# Patient Record
Sex: FEMALE | Race: WHITE | NOT HISPANIC OR LATINO
[De-identification: names, ages, dates, MRNs, and addresses within clinical notes are randomized per-mention and may not be internally consistent; named-entity substitution may affect disease eponyms.]

---

## 2019-01-17 ENCOUNTER — TRANSCRIPTION ENCOUNTER (OUTPATIENT)
Age: 54
End: 2019-01-17

## 2019-03-25 ENCOUNTER — RESULT REVIEW (OUTPATIENT)
Age: 54
End: 2019-03-25

## 2020-01-21 ENCOUNTER — TRANSCRIPTION ENCOUNTER (OUTPATIENT)
Age: 55
End: 2020-01-21

## 2020-05-05 ENCOUNTER — TRANSCRIPTION ENCOUNTER (OUTPATIENT)
Age: 55
End: 2020-05-05

## 2020-07-30 ENCOUNTER — RESULT REVIEW (OUTPATIENT)
Age: 55
End: 2020-07-30

## 2020-10-02 ENCOUNTER — TRANSCRIPTION ENCOUNTER (OUTPATIENT)
Age: 55
End: 2020-10-02

## 2020-12-11 ENCOUNTER — TRANSCRIPTION ENCOUNTER (OUTPATIENT)
Age: 55
End: 2020-12-11

## 2021-01-16 ENCOUNTER — TRANSCRIPTION ENCOUNTER (OUTPATIENT)
Age: 56
End: 2021-01-16

## 2021-01-18 PROBLEM — Z00.00 ENCOUNTER FOR PREVENTIVE HEALTH EXAMINATION: Status: ACTIVE | Noted: 2021-01-18

## 2021-01-20 ENCOUNTER — APPOINTMENT (OUTPATIENT)
Dept: ORTHOPEDIC SURGERY | Facility: CLINIC | Age: 56
End: 2021-01-20
Payer: COMMERCIAL

## 2021-01-20 ENCOUNTER — OUTPATIENT (OUTPATIENT)
Dept: OUTPATIENT SERVICES | Facility: HOSPITAL | Age: 56
LOS: 1 days | End: 2021-01-20
Payer: COMMERCIAL

## 2021-01-20 ENCOUNTER — RESULT REVIEW (OUTPATIENT)
Age: 56
End: 2021-01-20

## 2021-01-20 DIAGNOSIS — Z80.3 FAMILY HISTORY OF MALIGNANT NEOPLASM OF BREAST: ICD-10-CM

## 2021-01-20 DIAGNOSIS — Z87.39 PERSONAL HISTORY OF OTHER DISEASES OF THE MUSCULOSKELETAL SYSTEM AND CONNECTIVE TISSUE: ICD-10-CM

## 2021-01-20 DIAGNOSIS — Z86.79 PERSONAL HISTORY OF OTHER DISEASES OF THE CIRCULATORY SYSTEM: ICD-10-CM

## 2021-01-20 DIAGNOSIS — Z85.820 PERSONAL HISTORY OF MALIGNANT MELANOMA OF SKIN: ICD-10-CM

## 2021-01-20 PROCEDURE — 73610 X-RAY EXAM OF ANKLE: CPT

## 2021-01-20 PROCEDURE — 99072 ADDL SUPL MATRL&STAF TM PHE: CPT

## 2021-01-20 PROCEDURE — 73630 X-RAY EXAM OF FOOT: CPT | Mod: 26,LT

## 2021-01-20 PROCEDURE — 73630 X-RAY EXAM OF FOOT: CPT

## 2021-01-20 PROCEDURE — 99204 OFFICE O/P NEW MOD 45 MIN: CPT

## 2021-01-20 PROCEDURE — 73610 X-RAY EXAM OF ANKLE: CPT | Mod: 26,LT

## 2021-01-20 RX ORDER — OLMESARTAN MEDOXOMIL 20 MG/1
20 TABLET, FILM COATED ORAL
Refills: 0 | Status: ACTIVE | COMMUNITY

## 2021-01-20 RX ORDER — VALACYCLOVIR 500 MG/1
500 TABLET, FILM COATED ORAL
Refills: 0 | Status: ACTIVE | COMMUNITY

## 2021-01-28 ENCOUNTER — APPOINTMENT (OUTPATIENT)
Dept: RHEUMATOLOGY | Facility: CLINIC | Age: 56
End: 2021-01-28
Payer: COMMERCIAL

## 2021-01-28 VITALS
HEIGHT: 66 IN | DIASTOLIC BLOOD PRESSURE: 88 MMHG | WEIGHT: 130 LBS | BODY MASS INDEX: 20.89 KG/M2 | TEMPERATURE: 97.6 F | SYSTOLIC BLOOD PRESSURE: 167 MMHG | OXYGEN SATURATION: 99 % | HEART RATE: 54 BPM

## 2021-01-28 DIAGNOSIS — M25.579 PAIN IN UNSPECIFIED ANKLE AND JOINTS OF UNSPECIFIED FOOT: ICD-10-CM

## 2021-01-28 PROCEDURE — 99072 ADDL SUPL MATRL&STAF TM PHE: CPT

## 2021-01-28 PROCEDURE — 36415 COLL VENOUS BLD VENIPUNCTURE: CPT

## 2021-01-28 PROCEDURE — 99243 OFF/OP CNSLTJ NEW/EST LOW 30: CPT | Mod: 25

## 2021-01-29 LAB
ALBUMIN SERPL ELPH-MCNC: 4.5 G/DL
ALP BLD-CCNC: 45 U/L
ALT SERPL-CCNC: 18 U/L
ANION GAP SERPL CALC-SCNC: 13 MMOL/L
AST SERPL-CCNC: 22 U/L
BASOPHILS # BLD AUTO: 0.02 K/UL
BASOPHILS NFR BLD AUTO: 0.5 %
BILIRUB SERPL-MCNC: 0.3 MG/DL
BUN SERPL-MCNC: 14 MG/DL
CALCIUM SERPL-MCNC: 9.7 MG/DL
CHLORIDE SERPL-SCNC: 105 MMOL/L
CO2 SERPL-SCNC: 23 MMOL/L
CREAT SERPL-MCNC: 0.69 MG/DL
CRP SERPL-MCNC: <0.1 MG/DL
EOSINOPHIL # BLD AUTO: 0.16 K/UL
EOSINOPHIL NFR BLD AUTO: 4.1 %
ERYTHROCYTE [SEDIMENTATION RATE] IN BLOOD BY WESTERGREN METHOD: 10 MM/HR
GLUCOSE SERPL-MCNC: 100 MG/DL
HCT VFR BLD CALC: 35.7 %
HGB BLD-MCNC: 11.1 G/DL
IMM GRANULOCYTES NFR BLD AUTO: 0 %
LYMPHOCYTES # BLD AUTO: 1.39 K/UL
LYMPHOCYTES NFR BLD AUTO: 35.6 %
MAN DIFF?: NORMAL
MCHC RBC-ENTMCNC: 31.1 GM/DL
MCHC RBC-ENTMCNC: 32.4 PG
MCV RBC AUTO: 104.1 FL
MONOCYTES # BLD AUTO: 0.51 K/UL
MONOCYTES NFR BLD AUTO: 13.1 %
NEUTROPHILS # BLD AUTO: 1.82 K/UL
NEUTROPHILS NFR BLD AUTO: 46.7 %
PLATELET # BLD AUTO: 281 K/UL
POTASSIUM SERPL-SCNC: 4.1 MMOL/L
PROT SERPL-MCNC: 6.7 G/DL
RBC # BLD: 3.43 M/UL
RBC # FLD: 12.5 %
SODIUM SERPL-SCNC: 142 MMOL/L
WBC # FLD AUTO: 3.9 K/UL

## 2021-01-30 LAB — RHEUMATOID FACT SER QL: <10 IU/ML

## 2021-01-31 LAB
CCP AB SER IA-ACNC: <8 UNITS
RF+CCP IGG SER-IMP: NEGATIVE

## 2021-01-31 NOTE — ASSESSMENT
[FreeTextEntry1] : 55 year old woman referred by orthopedist for rheumatologic evaluation.  Patient with left foot and ankle pain and swelling of the left achilles and surrounding lateral aspect of the ankle.  Patient reports some improvement in symptoms, although still with prominent swelling.  Given chronic nature of symptoms and severity of swelling,will evaluated for an underlying inflammatory arthropathy, will check cbc, cmp,esr, crp, RF, CCP, and Hla b27.  Patient reports bone density in 2019, she will forward results to office for review.  Depending on date of last bone density, may obtain additional studies as well.

## 2021-01-31 NOTE — PHYSICAL EXAM
[General Appearance - Alert] : alert [General Appearance - In No Acute Distress] : in no acute distress [General Appearance - Well-Appearing] : healthy appearing [Sclera] : the sclera and conjunctiva were normal [Respiration, Rhythm And Depth] : normal respiratory rhythm and effort [Exaggerated Use Of Accessory Muscles For Inspiration] : no accessory muscle use [No Spinal Tenderness] : no spinal tenderness [Nail Clubbing] : no clubbing  or cyanosis of the fingernails [Skin Color & Pigmentation] : normal skin color and pigmentation [] : no rash [Skin Lesions] : no skin lesions [Oriented To Time, Place, And Person] : oriented to person, place, and time [Impaired Insight] : insight and judgment were intact [Affect] : the affect was normal [Mood] : the mood was normal [FreeTextEntry1] : CAM boot on left lower extremity.  Edema of the achilles and lateral ankle with tenderness.  No erythema.  (Images reviewed on phone as well, previously more swollen).

## 2021-01-31 NOTE — HISTORY OF PRESENT ILLNESS
[FreeTextEntry1] : 55 year old women referred by orthopedist\par \par Patient with chronic left ankle and foot pain\par Better than previously, but may need surgery in the next month \par Chronic achilles pain and swelling with tears \par Bone spur as well\par \par Patient with "wear and tear," for about the past five years, worse over the last 6 months, no identifiable injury or trauma\par Currently In a CAM boot, on the left foot for the last two weeks\par Fractured fibula in May on the right, healing well \par \par Was exercising through the pain as well\par Exercises, works as , feels ok on the bike, continues upper body exercises \par \par HIstory of stress fractures in both feet through the years from running\par No recent menses, last 11/2014\par \par No history of hormone replacement \par DEXA 9/19\par Calcium supplements\par Does not eat dairy products, only milk with cereal\par \par No family history of osteoporosis\par No family history of psoriasis\par No family history of arthritis\par \par No joint pains of the upper extremities, no pain in the knees or hips\par History of  L4-L5 degeneration, sees orthopedist on the upper [place], had epidural shots about three months ago which helped with back pain\par Acupuncture in future\par Feels pain may be related to uneven gait due to the boot \par No history of trauma \par \par No history of UC or Crohns\par No history of psoriasis\par No known history of osteoporosis

## 2021-01-31 NOTE — CONSULT LETTER
[Dear  ___] : Dear  [unfilled], [Consult Letter:] : I had the pleasure of evaluating your patient, [unfilled]. [Please see my note below.] : Please see my note below. [Consult Closing:] : Thank you very much for allowing me to participate in the care of this patient.  If you have any questions, please do not hesitate to contact me. [Sincerely,] : Sincerely, [FreeTextEntry3] : Kailyn Noyola MD, MPH

## 2021-01-31 NOTE — DATA REVIEWED
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 3 views of the right ankle and 3 views of the right foot \par \par Clinical indications: Right leg pain. \par \par IMPRESSION: There is a nondisplaced transverse fracture of the distal fibula \par approximately 6.3 cm superior to the distal aspect of the fibula. The \par tibiotalar joint is intact. There is no fracture of the proximal fibula or \par of the tibia. There is minimal degenerative change of the knee. \par \par There is mild degenerative change at the first MTP joint. Moderate \par degenerative changes seen at the first TMT joint. \par

## 2021-02-03 ENCOUNTER — APPOINTMENT (OUTPATIENT)
Dept: ORTHOPEDIC SURGERY | Facility: CLINIC | Age: 56
End: 2021-02-03
Payer: COMMERCIAL

## 2021-02-03 VITALS — BODY MASS INDEX: 20.89 KG/M2 | WEIGHT: 130 LBS | HEIGHT: 66 IN | RESPIRATION RATE: 16 BRPM

## 2021-02-03 PROCEDURE — 99072 ADDL SUPL MATRL&STAF TM PHE: CPT

## 2021-02-03 PROCEDURE — 99214 OFFICE O/P EST MOD 30 MIN: CPT

## 2021-02-04 ENCOUNTER — TRANSCRIPTION ENCOUNTER (OUTPATIENT)
Age: 56
End: 2021-02-04

## 2021-02-05 LAB — HLA-B27 RELATED AG QL: NEGATIVE

## 2021-02-15 ENCOUNTER — TRANSCRIPTION ENCOUNTER (OUTPATIENT)
Age: 56
End: 2021-02-15

## 2021-02-17 ENCOUNTER — TRANSCRIPTION ENCOUNTER (OUTPATIENT)
Age: 56
End: 2021-02-17

## 2021-02-18 ENCOUNTER — APPOINTMENT (OUTPATIENT)
Dept: ORTHOPEDIC SURGERY | Facility: AMBULATORY SURGERY CENTER | Age: 56
End: 2021-02-18

## 2021-02-18 ENCOUNTER — RESULT REVIEW (OUTPATIENT)
Age: 56
End: 2021-02-18

## 2021-02-18 ENCOUNTER — OUTPATIENT (OUTPATIENT)
Dept: OUTPATIENT SERVICES | Facility: HOSPITAL | Age: 56
LOS: 1 days | Discharge: HOME | End: 2021-02-18
Payer: COMMERCIAL

## 2021-02-18 PROCEDURE — 88304 TISSUE EXAM BY PATHOLOGIST: CPT | Mod: 26

## 2021-02-18 PROCEDURE — 27654 REPAIR OF ACHILLES TENDON: CPT | Mod: LT

## 2021-02-18 PROCEDURE — 28118 REMOVAL OF HEEL BONE: CPT | Mod: LT

## 2021-02-18 PROCEDURE — 27691 REVISE LOWER LEG TENDON: CPT | Mod: LT

## 2021-02-19 ENCOUNTER — NON-APPOINTMENT (OUTPATIENT)
Age: 56
End: 2021-02-19

## 2021-02-22 ENCOUNTER — NON-APPOINTMENT (OUTPATIENT)
Age: 56
End: 2021-02-22

## 2021-02-23 LAB — SURGICAL PATHOLOGY STUDY: SIGNIFICANT CHANGE UP

## 2021-03-03 ENCOUNTER — APPOINTMENT (OUTPATIENT)
Dept: ORTHOPEDIC SURGERY | Facility: CLINIC | Age: 56
End: 2021-03-03
Payer: COMMERCIAL

## 2021-03-03 PROCEDURE — 99024 POSTOP FOLLOW-UP VISIT: CPT

## 2021-03-04 VITALS — BODY MASS INDEX: 20.89 KG/M2 | WEIGHT: 130 LBS | HEIGHT: 66 IN | RESPIRATION RATE: 16 BRPM

## 2021-04-02 ENCOUNTER — APPOINTMENT (OUTPATIENT)
Dept: ORTHOPEDIC SURGERY | Facility: CLINIC | Age: 56
End: 2021-04-02
Payer: COMMERCIAL

## 2021-04-02 VITALS — RESPIRATION RATE: 16 BRPM | BODY MASS INDEX: 20.89 KG/M2 | WEIGHT: 130 LBS | HEIGHT: 66 IN

## 2021-04-02 PROCEDURE — 99024 POSTOP FOLLOW-UP VISIT: CPT

## 2021-04-02 PROCEDURE — 73650 X-RAY EXAM OF HEEL: CPT | Mod: LT

## 2021-04-02 RX ORDER — ONDANSETRON 4 MG/1
4 TABLET ORAL EVERY 8 HOURS
Qty: 15 | Refills: 2 | Status: DISCONTINUED | COMMUNITY
Start: 2021-02-15 | End: 2021-04-02

## 2021-04-02 RX ORDER — OXYCODONE 5 MG/1
5 TABLET ORAL
Qty: 50 | Refills: 0 | Status: DISCONTINUED | COMMUNITY
Start: 2021-02-15 | End: 2021-04-02

## 2021-04-02 RX ORDER — DOCUSATE SODIUM 100 MG/1
100 CAPSULE ORAL TWICE DAILY
Qty: 28 | Refills: 3 | Status: DISCONTINUED | COMMUNITY
Start: 2021-02-15 | End: 2021-04-02

## 2021-04-18 ENCOUNTER — TRANSCRIPTION ENCOUNTER (OUTPATIENT)
Age: 56
End: 2021-04-18

## 2021-05-10 ENCOUNTER — APPOINTMENT (OUTPATIENT)
Dept: ORTHOPEDIC SURGERY | Facility: CLINIC | Age: 56
End: 2021-05-10
Payer: COMMERCIAL

## 2021-05-10 VITALS — WEIGHT: 130 LBS | RESPIRATION RATE: 16 BRPM | HEIGHT: 66 IN | BODY MASS INDEX: 20.89 KG/M2

## 2021-05-10 PROCEDURE — 99024 POSTOP FOLLOW-UP VISIT: CPT

## 2021-05-10 RX ORDER — OXYCODONE 5 MG/1
5 TABLET ORAL
Qty: 40 | Refills: 0 | Status: DISCONTINUED | COMMUNITY
Start: 2021-04-02 | End: 2021-05-10

## 2021-08-02 ENCOUNTER — APPOINTMENT (OUTPATIENT)
Dept: ORTHOPEDIC SURGERY | Facility: CLINIC | Age: 56
End: 2021-08-02
Payer: COMMERCIAL

## 2021-08-02 VITALS — RESPIRATION RATE: 16 BRPM | HEIGHT: 66 IN | WEIGHT: 130 LBS | BODY MASS INDEX: 20.89 KG/M2

## 2021-08-02 PROCEDURE — 99213 OFFICE O/P EST LOW 20 MIN: CPT

## 2021-08-02 PROCEDURE — 73650 X-RAY EXAM OF HEEL: CPT | Mod: LT

## 2021-08-02 RX ORDER — GABAPENTIN 300 MG/1
300 CAPSULE ORAL DAILY
Qty: 110 | Refills: 0 | Status: DISCONTINUED | COMMUNITY
Start: 2021-02-05 | End: 2021-08-02

## 2021-08-02 RX ORDER — MELOXICAM 15 MG/1
15 TABLET ORAL
Qty: 90 | Refills: 0 | Status: DISCONTINUED | COMMUNITY
Start: 2021-01-20 | End: 2021-08-02

## 2021-08-02 RX ORDER — ASPIRIN 325 MG/1
325 TABLET, FILM COATED ORAL
Qty: 60 | Refills: 2 | Status: DISCONTINUED | COMMUNITY
Start: 2021-02-15 | End: 2021-08-02

## 2021-08-05 ENCOUNTER — RESULT REVIEW (OUTPATIENT)
Age: 56
End: 2021-08-05

## 2021-08-10 ENCOUNTER — TRANSCRIPTION ENCOUNTER (OUTPATIENT)
Age: 56
End: 2021-08-10

## 2021-11-29 ENCOUNTER — APPOINTMENT (OUTPATIENT)
Dept: ORTHOPEDIC SURGERY | Facility: CLINIC | Age: 56
End: 2021-11-29
Payer: COMMERCIAL

## 2021-11-29 VITALS — RESPIRATION RATE: 16 BRPM | BODY MASS INDEX: 20.89 KG/M2 | HEIGHT: 66 IN | WEIGHT: 130 LBS

## 2021-11-29 DIAGNOSIS — G89.29 PAIN IN LEFT ANKLE AND JOINTS OF LEFT FOOT: ICD-10-CM

## 2021-11-29 DIAGNOSIS — M77.52 OTHER ENTHESOPATHY OF LT FOOT AND ANKLE: ICD-10-CM

## 2021-11-29 DIAGNOSIS — M25.572 PAIN IN LEFT ANKLE AND JOINTS OF LEFT FOOT: ICD-10-CM

## 2021-11-29 DIAGNOSIS — S86.012A STRAIN OF LEFT ACHILLES TENDON, INITIAL ENCOUNTER: ICD-10-CM

## 2021-11-29 DIAGNOSIS — M92.62 JUVENILE OSTEOCHONDROSIS OF TARSUS, LEFT ANKLE: ICD-10-CM

## 2021-11-29 DIAGNOSIS — M54.30 SCIATICA, UNSPECIFIED SIDE: ICD-10-CM

## 2021-11-29 PROCEDURE — 99213 OFFICE O/P EST LOW 20 MIN: CPT

## 2021-11-29 PROCEDURE — 73650 X-RAY EXAM OF HEEL: CPT | Mod: LT

## 2022-01-26 ENCOUNTER — NON-APPOINTMENT (OUTPATIENT)
Age: 57
End: 2022-01-26

## 2022-02-02 ENCOUNTER — APPOINTMENT (OUTPATIENT)
Dept: PHYSICAL MEDICINE AND REHAB | Facility: CLINIC | Age: 57
End: 2022-02-02
Payer: COMMERCIAL

## 2022-02-02 VITALS — HEIGHT: 66 IN | BODY MASS INDEX: 20.89 KG/M2 | WEIGHT: 130 LBS | RESPIRATION RATE: 16 BRPM

## 2022-02-02 PROCEDURE — 99204 OFFICE O/P NEW MOD 45 MIN: CPT

## 2022-02-02 NOTE — PHYSICAL EXAM
[FreeTextEntry1] : LUMBAR\par GEN: AAOx3. NAD.\par INSP: Spine alignment midline. No evidence of scoliosis.\par STANCE: Trendelenburg/SLS (-) R (-) L. \par GAIT: (-) Antalgic. Normal reciprocating heel to toe\par SENS: Grossly intact to LT BLE.\par REFLEXES: Symmetric patella, achilles. \par TONE: Normal. No clonus.\par SPECIAL: SLR/Slump (-) R (-) L.   Gaenslen (-) R (-) L.\par                 FADIR (-) R (-) L.          DAMARIS (-) R (-) L.  \par PALP: Midline/Spinous processes (-).   Paraspinals (-) R  (-) L.   \par            QL (-) R (-) L.      SIJ (-) R (-) L.            GTB (-) R (-) L.\par \par LSpine          ROM    Axial Sx    LE Sx \par Flex               Full       (-)           R (-) L (-).\par Ext                 Full       (-)           R (-) L (-).\par Lat Flex         Full       (-)           R (-) L (-).       \par Rotation         Full       (-)           R (-) L (-). \par Oblique Ext    Full       (-)           R (-) L (-).  \par \par HIP ROM:    RIGHT   Sx        LEFT   Sx\par Flex             Full       (-)         Full     (-)\par IR                 Full       (-)        Full     (-)\par ER               Full       (-)        Full     (-)\par \par STRENGTH:    RIGHT      LEFT\par Hip Flex            5/5 5/5\par Hip ABd            5/5 5/5\par Knee Ext           5/5 5/5\par Ankle DF           5/5 5/5\par Ankle PF           5/5 5/5\par EHL                   5/5 5/5\par EV                    5/5 5/5

## 2022-02-02 NOTE — HISTORY OF PRESENT ILLNESS
[FreeTextEntry1] : Referring Physician: Dr. Rajendra Aldridge MD\par \par Ms. ABDI PADILLA is a very pleasant 56 year female who comes in for evaluation of Lower back pain that has been ongoing for 2 years without any specific injury or inciting event. Patient has tried Gabapentin, Celebrex, PT all of which helped. She also had 2 Lumbar Epidural Injections with Dr. Sourav Xie from [facility], on 05/2021 which helped temporarily. The pain is located primarily on Lower back radiating to BILATERAL LEGS intermittent and described as achy and sharp. The pain is rated as 3/10 and ranges from 2-9/10. The patient is unsure which symptoms aggravate and alleviated by Medication and PT. The patient works as a  which consists of sitting or standing. The patient denies any night pain, numbness/tingling, weakness, or bowel/bladder dysfunction. The patient has no other complaints at this time.

## 2022-02-02 NOTE — DATA REVIEWED
[MRI] : MRI [FreeTextEntry1] : MRI LS 08/2021: L4/5 Anterolisthesis. Bilateral facet arthrosis. Central canal stenosis. Bilateral lateral recess/foraminal stenosis.

## 2022-02-02 NOTE — ASSESSMENT
[FreeTextEntry1] : Impression:\par 1. Lumbar Spinal Stenosis and Radiculopathy\par \par Plan: The history, physical examination, and imaging were reviewed. The imaging findings and diagnosis were discussed with the patient along with treatment options including physical therapy, oral medication, interventional spine procedures, and surgery; as well as alternative therapeutics such as acupuncture and massage. We also discussed the importance of activity modification, ergonomics, and posture as they pertain to the current condition as well as overall health and well being. At this time the patient is interested in interventional options for symptom reduction. We will plan for Bilateral L5 TFESI. We discussed all the risks, benefits, alternative treatments, and prognosis. The patient expressed understanding and would like to move forward. We will schedule for the injection as well as follow up post-procedure. I provided renewed Rx for her Gabapentin and Celebrex, and an updated referral for PT. The patient expressed verbal understanding and is in agreement with the plan of care. All of the patient's questions and concerns were addressed during today's visit.\par

## 2022-03-10 ENCOUNTER — APPOINTMENT (OUTPATIENT)
Dept: PHYSICAL MEDICINE AND REHAB | Facility: CLINIC | Age: 57
End: 2022-03-10
Payer: COMMERCIAL

## 2022-03-10 PROCEDURE — 64483 NJX AA&/STRD TFRM EPI L/S 1: CPT | Mod: 50

## 2022-03-23 ENCOUNTER — APPOINTMENT (OUTPATIENT)
Dept: PHYSICAL MEDICINE AND REHAB | Facility: CLINIC | Age: 57
End: 2022-03-23
Payer: COMMERCIAL

## 2022-03-23 VITALS — HEIGHT: 66 IN | BODY MASS INDEX: 20.89 KG/M2 | WEIGHT: 130 LBS | RESPIRATION RATE: 16 BRPM

## 2022-03-23 PROCEDURE — 99213 OFFICE O/P EST LOW 20 MIN: CPT

## 2022-04-14 NOTE — HISTORY OF PRESENT ILLNESS
[FreeTextEntry1] : Ms. ABDI PADILLA is a very pleasant 56 year female who comes in for follow up of Lower back pain after undergoing an TERRI on 03/10/22. The patient reports approximately 70% improvement following the procedure. There is still some residual discomfort, which remains Lower back no longer radiating to the legs, intermittent and described as dull/achy. The pain is rated as 1/10 and ranges from 0-3/10. The patient is unsure which symptoms aggravate and alleviated by Medication and PT. The patient works as a  which consists of sitting or standing. The patient denies any night pain, numbness/tingling, weakness, or bowel/bladder dysfunction. The patient has no other complaints at this time.

## 2022-04-14 NOTE — ASSESSMENT
[FreeTextEntry1] : Impression:\par 1. Lumbar Spinal Stenosis and Radiculopathy\par \par Plan: The history, physical examination, and imaging were reviewed. The patient responded well to TERRI with good relief. The imaging and diagnosis were reviewed with the patient. She would like to return to a course of PT, I provided her with a referral today. We emphasized the importance of the home exercise program. The patient will follow up in 3-4 months. The patient expressed verbal understanding and is in agreement with the plan of care. All of the patient's questions and concerns were addressed during today's visit.\par

## 2022-05-13 ENCOUNTER — APPOINTMENT (OUTPATIENT)
Dept: ORTHOPEDIC SURGERY | Facility: CLINIC | Age: 57
End: 2022-05-13

## 2022-05-13 VITALS — WEIGHT: 130 LBS | BODY MASS INDEX: 20.89 KG/M2 | HEIGHT: 66 IN

## 2022-05-13 DIAGNOSIS — M76.62 ACHILLES TENDINITIS, LEFT LEG: ICD-10-CM

## 2022-05-13 DIAGNOSIS — M25.571 PAIN IN RIGHT ANKLE AND JOINTS OF RIGHT FOOT: ICD-10-CM

## 2022-05-13 DIAGNOSIS — M25.572 PAIN IN LEFT ANKLE AND JOINTS OF LEFT FOOT: ICD-10-CM

## 2022-05-13 DIAGNOSIS — S93.492A SPRAIN OF OTHER LIGAMENT OF LEFT ANKLE, INITIAL ENCOUNTER: ICD-10-CM

## 2022-05-13 DIAGNOSIS — M79.672 PAIN IN LEFT FOOT: ICD-10-CM

## 2022-05-13 DIAGNOSIS — M67.88 OTHER SPECIFIED DISORDERS OF SYNOVIUM AND TENDON, OTHER SITE: ICD-10-CM

## 2022-05-13 PROCEDURE — 73610 X-RAY EXAM OF ANKLE: CPT | Mod: 26,LT

## 2022-05-13 PROCEDURE — 99213 OFFICE O/P EST LOW 20 MIN: CPT

## 2022-05-13 PROCEDURE — 73630 X-RAY EXAM OF FOOT: CPT | Mod: 26,LT

## 2022-05-13 RX ORDER — GABAPENTIN 300 MG/1
300 CAPSULE ORAL
Qty: 30 | Refills: 0 | Status: DISCONTINUED | COMMUNITY
Start: 2022-02-02 | End: 2022-05-13

## 2022-05-27 ENCOUNTER — TRANSCRIPTION ENCOUNTER (OUTPATIENT)
Age: 57
End: 2022-05-27

## 2022-06-14 PROBLEM — M76.62 ACHILLES TENDINITIS OF LEFT LOWER EXTREMITY: Status: ACTIVE | Noted: 2021-01-20

## 2022-06-14 PROBLEM — S93.492A SPRAIN OF ANTERIOR TALOFIBULAR LIGAMENT OF LEFT ANKLE, INITIAL ENCOUNTER: Status: ACTIVE | Noted: 2022-06-14

## 2022-06-14 PROBLEM — M67.88 ACHILLES TENDINOSIS OF LEFT LOWER EXTREMITY: Status: ACTIVE | Noted: 2021-01-20

## 2022-06-14 PROBLEM — M79.672 PAIN OF LEFT HEEL: Status: ACTIVE | Noted: 2022-06-14

## 2022-06-14 PROBLEM — M25.571 ACUTE RIGHT ANKLE PAIN: Status: ACTIVE | Noted: 2022-05-13

## 2022-06-16 ENCOUNTER — NON-APPOINTMENT (OUTPATIENT)
Age: 57
End: 2022-06-16

## 2022-06-22 ENCOUNTER — APPOINTMENT (OUTPATIENT)
Dept: PHYSICAL MEDICINE AND REHAB | Facility: CLINIC | Age: 57
End: 2022-06-22
Payer: COMMERCIAL

## 2022-06-22 VITALS — WEIGHT: 130 LBS | HEIGHT: 66 IN | BODY MASS INDEX: 20.89 KG/M2

## 2022-06-22 PROCEDURE — 99214 OFFICE O/P EST MOD 30 MIN: CPT

## 2022-06-22 NOTE — ASSESSMENT
[FreeTextEntry1] : Impression:\par 1. Lumbar Spinal Stenosis and Radiculopathy\par \par Plan: The history, physical examination, and imaging were reviewed. The patient responded well to TERRI with good relief, however symptoms have started to return. The imaging and diagnosis were reviewed with the patient along with treatment options. She would like to repeat TERRI based on previous results. We discussed all the risks, benefits, alternative treatments, and prognosis. The patient expressed understanding and would like to move forward. We will schedule for the injection as well as follow up post-procedure. She is also ready to consider surgical options as duration of relief from TERRI will not be a sustainable treatment moving forward. I provided a referral to see Dr. Camacho along with a referral for new MRI LSpine. The patient expressed verbal understanding and is in agreement with the plan of care. All of the patient's questions and concerns were addressed during today's visit.\par

## 2022-06-22 NOTE — HISTORY OF PRESENT ILLNESS
[FreeTextEntry1] : Ms. ABDI PADILLA is a very pleasant 56 year female who comes in for follow up of Lower back pain. She had excellent relief after TERRI on 03/10/22, and she has been doing PT/HEP since then as well, however symptoms have been gradually returning over the past few weeks. Symptoms remain Lower back radiating to bilateral legs, intermittent and described as shooting, achy. The pain is rated as 4/10 and ranges from 3-8/10. The patient is unsure which symptoms aggravate and alleviated by Medication and PT. The patient works as a . The patient denies any night pain, numbness/tingling, weakness, or bowel/bladder dysfunction. The patient has no other complaints at this time.

## 2022-07-14 ENCOUNTER — APPOINTMENT (OUTPATIENT)
Dept: PHYSICAL MEDICINE AND REHAB | Facility: CLINIC | Age: 57
End: 2022-07-14

## 2022-07-14 PROCEDURE — 64483 NJX AA&/STRD TFRM EPI L/S 1: CPT | Mod: 50

## 2022-07-20 ENCOUNTER — APPOINTMENT (OUTPATIENT)
Dept: ORTHOPEDIC SURGERY | Facility: CLINIC | Age: 57
End: 2022-07-20

## 2022-07-20 VITALS
HEIGHT: 66 IN | SYSTOLIC BLOOD PRESSURE: 126 MMHG | DIASTOLIC BLOOD PRESSURE: 78 MMHG | HEART RATE: 67 BPM | OXYGEN SATURATION: 99 % | BODY MASS INDEX: 20.89 KG/M2 | WEIGHT: 130 LBS

## 2022-07-20 DIAGNOSIS — M43.16 SPONDYLOLISTHESIS, LUMBAR REGION: ICD-10-CM

## 2022-07-20 PROCEDURE — 99214 OFFICE O/P EST MOD 30 MIN: CPT

## 2022-07-20 PROCEDURE — 72083 X-RAY EXAM ENTIRE SPI 4/5 VW: CPT

## 2022-08-12 PROBLEM — M43.16 SPONDYLOLISTHESIS AT L4-L5 LEVEL: Status: ACTIVE | Noted: 2022-08-12

## 2022-08-12 NOTE — HISTORY OF PRESENT ILLNESS
[de-identified] : Initial 7/20/22: Ms. Madera was referred to us by Dr. Cook. Patient reports approximately a year and a half of low back pain that radiates posterolaterally down her right greater than left lower extremities. Patient had taken gabapentin in the past and is now taking up to 6 Aleve a day. She has also had 2 epidural steroid injections bilateral L5 transforaminal TFESI with Dr. Cook. First injection was in March of 2022 and gave her 3 months of significant relief. Last injection was approximately a week ago with no significant relief to date. Patient works as a cycling and . Current symptoms significantly interfere with her ability to perform ADLs and perform the duties of her job. She has been unable to run over the past year and a half and do high impact workouts. However, still struggles with low impact workouts due to her symptoms.

## 2022-08-12 NOTE — ADDENDUM
[FreeTextEntry1] : I, Carie Elliott, assisted with documentation on 07/20/2022 acting as scribe for Dr. Rojelio Camacho.

## 2022-08-12 NOTE — PHYSICAL EXAM
[de-identified] : General: patient is well developed, well nourished, in no acute \par distress, alert and oriented x 3. \par \par Mood and affect: normal\par \par Respiratory: no respiratory distress noted\par \par Alignment:The spine is well compensated in the coronal and sagittal plane. \par \par Gait: The patient is able to toe walk and heel walk without difficulty. \par \par Palpation: no tenderness to palpation spine or paraspinal region\par \par Neurologic Exam:\par Motor: Manual Muscle testing in the lower extremities is 5 out of 5 in all muscle groups. There is no evidence of muscular atrophy in the lower extremities. Sensory: Sensation to light touch is grossly intact in the lower extremities\par \par Hip Exam: No pain with internal or external rotation of hips bilaterally\par \par Special tests: Straight leg raise test negative. Cross straight leg test negative.  [de-identified] : MRI lumbar 7/18/22 (my read): L4 bilateral pars defects, L4/L5 grade 1 anterolisthesis, disc bulge and facet arthropathy with severe central canal stenosis, mild/moderate bilateral foraminal stenosis; L3/L4 grade 1 anterolisthesis, moderate central canal and mild/moderate bilateral foraminal stenosis\par \par XR 7/20/22 (my read): multilevel disc degeneration, grade 1/2 anterolisthesis L4 on L5 without evidence of dynamic instability, mild thoracolumbar scoliosis, no fractures seen

## 2022-08-15 ENCOUNTER — APPOINTMENT (OUTPATIENT)
Dept: ORTHOPEDIC SURGERY | Facility: CLINIC | Age: 57
End: 2022-08-15

## 2022-08-24 ENCOUNTER — APPOINTMENT (OUTPATIENT)
Dept: PHYSICAL MEDICINE AND REHAB | Facility: CLINIC | Age: 57
End: 2022-08-24

## 2022-08-24 VITALS — BODY MASS INDEX: 20.09 KG/M2 | WEIGHT: 125 LBS | HEIGHT: 66 IN

## 2022-08-24 PROCEDURE — 99214 OFFICE O/P EST MOD 30 MIN: CPT

## 2022-08-24 NOTE — HISTORY OF PRESENT ILLNESS
[FreeTextEntry1] : 08/24/2022: Patient is here for follow up after undergoing an TERRI on 07/14/22. The patient reports approximately 50% improvement following the procedure. There is still some residual discomfort, which remains lower back radiating to bilateral legs. The patient is planning to proceed with surgery in the next few months, however would like to consider repeat injection in the interim. The patient has no new complaints. \par  \par \par 06/22/2022: Ms. ABDI PADILLA is a very pleasant 56 year female who comes in for follow up of Lower back pain. She had excellent relief after TERRI on 03/10/22, and she has been doing PT/HEP since then as well, however symptoms have been gradually returning over the past few weeks. Symptoms remain Lower back radiating to bilateral legs, intermittent and described as shooting, achy. The pain is rated as 4/10 and ranges from 3-8/10. The patient is unsure which symptoms aggravate and alleviated by Medication and PT. The patient works as a . The patient denies any night pain, numbness/tingling, weakness, or bowel/bladder dysfunction. The patient has no other complaints at this time.

## 2022-08-24 NOTE — ASSESSMENT
[FreeTextEntry1] : Impression:\par 1. Lumbar Spinal Stenosis and Radiculopathy\par \par Plan: The history, physical examination, and imaging were reviewed. The patient responded well to TERRI. The imaging and diagnosis were reviewed with the patient along with treatment options. She would like to repeat TERRI prior to undergoing surgery. We will plan for Bilateral L5 TFESI. The patient expressed understanding and would like to move forward. We will schedule for the injection as well as follow up post-procedure. The patient expressed verbal understanding and is in agreement with the plan of care. All of the patient's questions and concerns were addressed during today's visit.\par

## 2022-08-29 ENCOUNTER — APPOINTMENT (OUTPATIENT)
Dept: ORTHOPEDIC SURGERY | Facility: CLINIC | Age: 57
End: 2022-08-29

## 2022-09-15 ENCOUNTER — APPOINTMENT (OUTPATIENT)
Dept: PHYSICAL MEDICINE AND REHAB | Facility: CLINIC | Age: 57
End: 2022-09-15

## 2022-09-15 DIAGNOSIS — M54.16 RADICULOPATHY, LUMBAR REGION: ICD-10-CM

## 2022-09-15 DIAGNOSIS — M48.062 SPINAL STENOSIS, LUMBAR REGION WITH NEUROGENIC CLAUDICATION: ICD-10-CM

## 2022-09-15 PROCEDURE — 64483 NJX AA&/STRD TFRM EPI L/S 1: CPT | Mod: 50

## 2022-09-22 PROBLEM — M54.16 LUMBAR RADICULOPATHY: Status: ACTIVE | Noted: 2022-02-02

## 2022-09-22 PROBLEM — M48.062 SPINAL STENOSIS, LUMBAR REGION WITH NEUROGENIC CLAUDICATION: Status: ACTIVE | Noted: 2022-02-02

## 2023-02-08 ENCOUNTER — NON-APPOINTMENT (OUTPATIENT)
Age: 58
End: 2023-02-08

## 2024-06-13 ENCOUNTER — APPOINTMENT (OUTPATIENT)
Dept: ORTHOPEDIC SURGERY | Facility: CLINIC | Age: 59
End: 2024-06-13

## 2024-06-25 ENCOUNTER — APPOINTMENT (OUTPATIENT)
Dept: ORTHOPEDIC SURGERY | Facility: CLINIC | Age: 59
End: 2024-06-25
Payer: COMMERCIAL

## 2024-06-25 VITALS — HEIGHT: 66 IN | WEIGHT: 125 LBS | BODY MASS INDEX: 20.09 KG/M2

## 2024-06-25 DIAGNOSIS — M75.101 UNSPECIFIED ROTATOR CUFF TEAR OR RUPTURE OF RIGHT SHOULDER, NOT SPECIFIED AS TRAUMATIC: ICD-10-CM

## 2024-06-25 PROCEDURE — 99214 OFFICE O/P EST MOD 30 MIN: CPT

## 2024-06-25 RX ORDER — DIAZEPAM 5 MG/1
5 TABLET ORAL
Qty: 4 | Refills: 0 | Status: DISCONTINUED | COMMUNITY
Start: 2022-07-12 | End: 2024-06-25

## 2024-06-25 RX ORDER — CELECOXIB 100 MG/1
100 CAPSULE ORAL TWICE DAILY
Qty: 60 | Refills: 0 | Status: DISCONTINUED | COMMUNITY
Start: 2022-02-02 | End: 2024-06-25

## 2024-08-15 ENCOUNTER — APPOINTMENT (OUTPATIENT)
Dept: ORTHOPEDIC SURGERY | Facility: CLINIC | Age: 59
End: 2024-08-15
Payer: COMMERCIAL

## 2024-08-15 VITALS — HEIGHT: 66 IN | WEIGHT: 125 LBS | BODY MASS INDEX: 20.09 KG/M2

## 2024-08-15 DIAGNOSIS — M75.101 UNSPECIFIED ROTATOR CUFF TEAR OR RUPTURE OF RIGHT SHOULDER, NOT SPECIFIED AS TRAUMATIC: ICD-10-CM

## 2024-08-15 PROCEDURE — 99213 OFFICE O/P EST LOW 20 MIN: CPT

## 2024-10-23 ENCOUNTER — APPOINTMENT (OUTPATIENT)
Age: 59
End: 2024-10-23

## 2025-01-10 NOTE — ASU PATIENT PROFILE, ADULT - FALL HARM RISK - UNIVERSAL INTERVENTIONS
Bed in lowest position, wheels locked, appropriate side rails in place/Call bell, personal items and telephone in reach/Instruct patient to call for assistance before getting out of bed or chair/Non-slip footwear when patient is out of bed/[place] to call system/Physically safe environment - no spills, clutter or unnecessary equipment/Purposeful Proactive Rounding/Room/bathroom lighting operational, light cord in reach

## 2025-01-10 NOTE — ASU PATIENT PROFILE, ADULT - NSICDXPASTSURGICALHX_GEN_ALL_CORE_FT
PAST SURGICAL HISTORY:  H/O melanoma excision     History of augmentation of both breasts     History of tonsillectomy     Rupture of left Achilles tendon descending ileac vein    S/P insertion of iliac artery stent     S/P lumbar spinal fusion L4-L5

## 2025-01-13 ENCOUNTER — TRANSCRIPTION ENCOUNTER (OUTPATIENT)
Age: 60
End: 2025-01-13

## 2025-01-13 ENCOUNTER — OUTPATIENT (OUTPATIENT)
Dept: OUTPATIENT SERVICES | Facility: HOSPITAL | Age: 60
LOS: 1 days | Discharge: HOME | End: 2025-01-13
Payer: COMMERCIAL

## 2025-01-13 VITALS
TEMPERATURE: 98 F | DIASTOLIC BLOOD PRESSURE: 72 MMHG | HEIGHT: 66 IN | HEART RATE: 54 BPM | WEIGHT: 123.24 LBS | SYSTOLIC BLOOD PRESSURE: 126 MMHG | RESPIRATION RATE: 16 BRPM | OXYGEN SATURATION: 100 %

## 2025-01-13 VITALS
SYSTOLIC BLOOD PRESSURE: 124 MMHG | OXYGEN SATURATION: 96 % | DIASTOLIC BLOOD PRESSURE: 68 MMHG | HEART RATE: 59 BPM | RESPIRATION RATE: 16 BRPM | TEMPERATURE: 98 F

## 2025-01-13 DIAGNOSIS — Z98.890 OTHER SPECIFIED POSTPROCEDURAL STATES: Chronic | ICD-10-CM

## 2025-01-13 DIAGNOSIS — S86.012A STRAIN OF LEFT ACHILLES TENDON, INITIAL ENCOUNTER: Chronic | ICD-10-CM

## 2025-01-13 DIAGNOSIS — Z95.828 PRESENCE OF OTHER VASCULAR IMPLANTS AND GRAFTS: Chronic | ICD-10-CM

## 2025-01-13 DIAGNOSIS — Z98.1 ARTHRODESIS STATUS: Chronic | ICD-10-CM

## 2025-01-13 DIAGNOSIS — Z90.89 ACQUIRED ABSENCE OF OTHER ORGANS: Chronic | ICD-10-CM

## 2025-01-13 PROCEDURE — 49650 LAP ING HERNIA REPAIR INIT: CPT | Mod: AS,LT

## 2025-01-13 DEVICE — MESH HERNIA INGUINAL 3DMAX MEDIUM 3 X 5" LEFT: Type: IMPLANTABLE DEVICE | Site: LEFT | Status: FUNCTIONAL

## 2025-01-13 RX ORDER — OLMESARTAN MEDOXOMIL 40 MG/1
1 TABLET, FILM COATED ORAL
Refills: 0 | DISCHARGE

## 2025-01-13 RX ORDER — ONDANSETRON 4 MG/1
4 TABLET ORAL ONCE
Refills: 0 | Status: COMPLETED | OUTPATIENT
Start: 2025-01-13 | End: 2025-01-13

## 2025-01-13 RX ORDER — CHLORHEXIDINE GLUCONATE 1.2 MG/ML
1 RINSE ORAL ONCE
Refills: 0 | Status: COMPLETED | OUTPATIENT
Start: 2025-01-13 | End: 2025-01-13

## 2025-01-13 RX ORDER — ACETAMINOPHEN 80 MG/.8ML
1000 SOLUTION/ DROPS ORAL ONCE
Refills: 0 | Status: COMPLETED | OUTPATIENT
Start: 2025-01-13 | End: 2025-01-13

## 2025-01-13 RX ORDER — APREPITANT 40 MG/1
40 CAPSULE ORAL ONCE
Refills: 0 | Status: COMPLETED | OUTPATIENT
Start: 2025-01-13 | End: 2025-01-13

## 2025-01-13 RX ORDER — SODIUM CHLORIDE 9 MG/ML
500 INJECTION, SOLUTION INTRAVENOUS
Refills: 0 | Status: DISCONTINUED | OUTPATIENT
Start: 2025-01-13 | End: 2025-01-13

## 2025-01-13 RX ORDER — OXYCODONE AND ACETAMINOPHEN 5; 325 MG/1; MG/1
1 TABLET ORAL
Qty: 20 | Refills: 0
Start: 2025-01-13

## 2025-01-13 RX ORDER — VALACYCLOVIR HYDROCHLORIDE 1000 MG/1
1 TABLET, FILM COATED ORAL
Refills: 0 | DISCHARGE

## 2025-01-13 RX ADMIN — ONDANSETRON 4 MILLIGRAM(S): 4 TABLET ORAL at 10:55

## 2025-01-13 RX ADMIN — CHLORHEXIDINE GLUCONATE 1 APPLICATION(S): 1.2 RINSE ORAL at 05:55

## 2025-01-13 RX ADMIN — APREPITANT 40 MILLIGRAM(S): 40 CAPSULE ORAL at 06:15

## 2025-01-13 RX ADMIN — ACETAMINOPHEN 1000 MILLIGRAM(S): 80 SOLUTION/ DROPS ORAL at 06:15

## 2025-01-13 NOTE — ASU DISCHARGE PLAN (ADULT/PEDIATRIC) - NS MD DC FALL RISK RISK
For information on Fall & Injury Prevention, visit: [email or web address] OR  [email or web address] OR  https://www.cdc.gov/steadi/patient.html

## 2025-01-13 NOTE — ASU DISCHARGE PLAN (ADULT/PEDIATRIC) - FINANCIAL ASSISTANCE
[facility] provides services at a reduced cost to those who are determined to be eligible through [facility]’s financial assistance program. Information regarding [facility]’s financial assistance program can be found by going to [email or web address] or by calling [phone].

## 2025-01-13 NOTE — PRE-ANESTHESIA EVALUATION ADULT - NSANTHOSAYNRD_GEN_A_CORE
No. HARPREET screening performed.  STOP BANG Legend: 0-2 = LOW Risk; 3-4 = INTERMEDIATE Risk; 5-8 = HIGH Risk

## (undated) DEVICE — ELCTR GROUNDING PAD ADULT COVIDIEN

## (undated) DEVICE — WARMING BLANKET LOWER ADULT

## (undated) DEVICE — TROCAR COVIDIEN VERSAONE BLUNT TIP HASSAN 12MM

## (undated) DEVICE — SUSPENSORY WITH LEG STRAPS LARGE

## (undated) DEVICE — XI TIP COVER

## (undated) DEVICE — XI CANNULA SEAL 5MM - 8 MM

## (undated) DEVICE — GOWN ROYAL SILK XL

## (undated) DEVICE — D HELP - CLEARVIEW CLEARIFY SYSTEM

## (undated) DEVICE — XI DRAPE COLUMN

## (undated) DEVICE — XI ENDOWRIST 12 - 8 MM CANNULA REDUCER

## (undated) DEVICE — NDL LABORIE INJETAK ADJUSTABLE TIP 35CM

## (undated) DEVICE — SUT VICRYL 0 27" UR-6

## (undated) DEVICE — PACK GENERAL LAPAROSCOPY

## (undated) DEVICE — GLV 7 PROTEXIS (WHITE)

## (undated) DEVICE — SUPP ATHLETIC MALE XLG 44-55IN

## (undated) DEVICE — DRAPE TOP SHEET 53" X 101"

## (undated) DEVICE — GLV 6.5 PROTEXIS (WHITE)

## (undated) DEVICE — XI 12MM AND STAPLER CANNULA SEAL

## (undated) DEVICE — VENODYNE/SCD SLEEVE CALF MEDIUM

## (undated) DEVICE — XI ARM FORCEP CADIERE 8MM

## (undated) DEVICE — XI DRAPE ARM

## (undated) DEVICE — SUT VLOC 180 2-0 9" GS-22 GREEN

## (undated) DEVICE — SUT PDO 0 1/2 CIRCLE 22MM NDL 20CM

## (undated) DEVICE — MARKING PEN W RULER

## (undated) DEVICE — ELCTR BOVIE PENCIL HANDPIECE ROCKER SWITCH 15FT

## (undated) DEVICE — SUT MONOCRYL 4-0 27" PS-2 UNDYED

## (undated) DEVICE — XI OBTURATOR OPTICAL BLADELESS 8MM